# Patient Record
Sex: MALE | Race: WHITE | NOT HISPANIC OR LATINO | URBAN - METROPOLITAN AREA
[De-identification: names, ages, dates, MRNs, and addresses within clinical notes are randomized per-mention and may not be internally consistent; named-entity substitution may affect disease eponyms.]

---

## 2023-05-25 ENCOUNTER — OFFICE VISIT (OUTPATIENT)
Dept: PRIMARY CARE | Facility: CLINIC | Age: 58
End: 2023-05-25
Payer: COMMERCIAL

## 2023-05-25 ENCOUNTER — LAB (OUTPATIENT)
Dept: LAB | Facility: LAB | Age: 58
End: 2023-05-25
Payer: COMMERCIAL

## 2023-05-25 VITALS
OXYGEN SATURATION: 96 % | DIASTOLIC BLOOD PRESSURE: 113 MMHG | SYSTOLIC BLOOD PRESSURE: 173 MMHG | TEMPERATURE: 98.7 F | WEIGHT: 208 LBS | HEART RATE: 94 BPM | BODY MASS INDEX: 27.44 KG/M2 | RESPIRATION RATE: 18 BRPM

## 2023-05-25 DIAGNOSIS — E53.8 VITAMIN B12 DEFICIENCY: ICD-10-CM

## 2023-05-25 DIAGNOSIS — E55.9 VITAMIN D DEFICIENCY: ICD-10-CM

## 2023-05-25 DIAGNOSIS — N40.0 BENIGN PROSTATIC HYPERPLASIA, UNSPECIFIED WHETHER LOWER URINARY TRACT SYMPTOMS PRESENT: ICD-10-CM

## 2023-05-25 DIAGNOSIS — E78.6 LOW HDL (UNDER 40): ICD-10-CM

## 2023-05-25 DIAGNOSIS — I10 BENIGN DIASTOLIC HYPERTENSION: ICD-10-CM

## 2023-05-25 DIAGNOSIS — Z00.00 ENCOUNTER FOR HEALTH MAINTENANCE EXAMINATION: Primary | ICD-10-CM

## 2023-05-25 DIAGNOSIS — Z00.00 ENCOUNTER FOR HEALTH MAINTENANCE EXAMINATION: ICD-10-CM

## 2023-05-25 LAB
ALANINE AMINOTRANSFERASE (SGPT) (U/L) IN SER/PLAS: 20 U/L (ref 10–52)
ALBUMIN (G/DL) IN SER/PLAS: 4.6 G/DL (ref 3.4–5)
ALKALINE PHOSPHATASE (U/L) IN SER/PLAS: 58 U/L (ref 33–120)
ANION GAP IN SER/PLAS: 10 MMOL/L (ref 10–20)
ASPARTATE AMINOTRANSFERASE (SGOT) (U/L) IN SER/PLAS: 17 U/L (ref 9–39)
BILIRUBIN TOTAL (MG/DL) IN SER/PLAS: 1.3 MG/DL (ref 0–1.2)
CALCIDIOL (25 OH VITAMIN D3) (NG/ML) IN SER/PLAS: 21 NG/ML
CALCIUM (MG/DL) IN SER/PLAS: 9.9 MG/DL (ref 8.6–10.3)
CARBON DIOXIDE, TOTAL (MMOL/L) IN SER/PLAS: 32 MMOL/L (ref 21–32)
CHLORIDE (MMOL/L) IN SER/PLAS: 103 MMOL/L (ref 98–107)
CHOLESTEROL (MG/DL) IN SER/PLAS: 208 MG/DL (ref 0–199)
CHOLESTEROL IN HDL (MG/DL) IN SER/PLAS: 42.4 MG/DL
CHOLESTEROL/HDL RATIO: 4.9
COBALAMIN (VITAMIN B12) (PG/ML) IN SER/PLAS: 276 PG/ML (ref 211–911)
CREATININE (MG/DL) IN SER/PLAS: 1.27 MG/DL (ref 0.5–1.3)
ERYTHROCYTE DISTRIBUTION WIDTH (RATIO) BY AUTOMATED COUNT: 12.2 % (ref 11.5–14.5)
ERYTHROCYTE MEAN CORPUSCULAR HEMOGLOBIN CONCENTRATION (G/DL) BY AUTOMATED: 32.6 G/DL (ref 32–36)
ERYTHROCYTE MEAN CORPUSCULAR VOLUME (FL) BY AUTOMATED COUNT: 89 FL (ref 80–100)
ERYTHROCYTES (10*6/UL) IN BLOOD BY AUTOMATED COUNT: 5.3 X10E12/L (ref 4.5–5.9)
GFR MALE: 66 ML/MIN/1.73M2
GLUCOSE (MG/DL) IN SER/PLAS: 95 MG/DL (ref 74–99)
HEMATOCRIT (%) IN BLOOD BY AUTOMATED COUNT: 47.3 % (ref 41–52)
HEMOGLOBIN (G/DL) IN BLOOD: 15.4 G/DL (ref 13.5–17.5)
LDL: 145 MG/DL (ref 0–99)
LEUKOCYTES (10*3/UL) IN BLOOD BY AUTOMATED COUNT: 6.9 X10E9/L (ref 4.4–11.3)
PLATELETS (10*3/UL) IN BLOOD AUTOMATED COUNT: 211 X10E9/L (ref 150–450)
POTASSIUM (MMOL/L) IN SER/PLAS: 4.2 MMOL/L (ref 3.5–5.3)
PROSTATE SPECIFIC AG (NG/ML) IN SER/PLAS: 1.08 NG/ML (ref 0–4)
PROTEIN TOTAL: 7.3 G/DL (ref 6.4–8.2)
SODIUM (MMOL/L) IN SER/PLAS: 141 MMOL/L (ref 136–145)
TRIGLYCERIDE (MG/DL) IN SER/PLAS: 104 MG/DL (ref 0–149)
UREA NITROGEN (MG/DL) IN SER/PLAS: 17 MG/DL (ref 6–23)
VLDL: 21 MG/DL (ref 0–40)

## 2023-05-25 PROCEDURE — 82306 VITAMIN D 25 HYDROXY: CPT

## 2023-05-25 PROCEDURE — 80061 LIPID PANEL: CPT

## 2023-05-25 PROCEDURE — 3080F DIAST BP >= 90 MM HG: CPT | Performed by: FAMILY MEDICINE

## 2023-05-25 PROCEDURE — 85027 COMPLETE CBC AUTOMATED: CPT

## 2023-05-25 PROCEDURE — 84153 ASSAY OF PSA TOTAL: CPT

## 2023-05-25 PROCEDURE — 36415 COLL VENOUS BLD VENIPUNCTURE: CPT

## 2023-05-25 PROCEDURE — 82607 VITAMIN B-12: CPT

## 2023-05-25 PROCEDURE — 80053 COMPREHEN METABOLIC PANEL: CPT

## 2023-05-25 PROCEDURE — 99396 PREV VISIT EST AGE 40-64: CPT | Performed by: FAMILY MEDICINE

## 2023-05-25 PROCEDURE — 1036F TOBACCO NON-USER: CPT | Performed by: FAMILY MEDICINE

## 2023-05-25 PROCEDURE — 3077F SYST BP >= 140 MM HG: CPT | Performed by: FAMILY MEDICINE

## 2023-05-25 RX ORDER — LOSARTAN POTASSIUM 50 MG/1
50 TABLET ORAL DAILY
Qty: 90 TABLET | Refills: 1 | Status: SHIPPED | OUTPATIENT
Start: 2023-05-25 | End: 2023-07-20 | Stop reason: DRUGHIGH

## 2023-05-25 RX ORDER — LOSARTAN POTASSIUM 50 MG/1
1 TABLET ORAL DAILY
COMMUNITY
Start: 2019-07-22 | End: 2023-05-25 | Stop reason: SDUPTHER

## 2023-05-25 NOTE — PROGRESS NOTES
Subjective   Patient ID: Pacheco Jacobs is a 57 y.o. male who presents for Annual Exam (No complaints. Needs refills. ).    HPI     Review of Systems    Objective   BP (!) 173/113   Pulse 94   Temp 37.1 °C (98.7 °F)   Resp 18   Wt 94.3 kg (208 lb)   SpO2 96%   BMI 27.44 kg/m²     Physical Exam    Assessment/Plan

## 2023-05-25 NOTE — PROGRESS NOTES
"Subjective   Patient ID: Pacheco Jacobs is a 57 y.o. male who presents for Annual Exam (No complaints. Needs refills. ).    HPI   Patient comes in today for complete physical exam.  He is fasting for labs.  He is taking and using all his medicines as instructed.  His blood pressure is high today he has not been checking it at home.  I have asked him to start checking it at home and if it continues to be high we will need to adjust his medication.  He understands.    Family history: Mother hypertension.  Patient has 3 brothers.  He and his brother are the youngest as twins.  2 older brothers both with alcohol issues.  All have hypertension.  Dad .        2022  Patient comes in today, it has been over a year since he was here last. He states he ran out of his medications over a month ago and yet his blood pressure today is not too bad. He however does have strong family history of hypertension and wants to continue the medication. He is due for lab work as well.    2021  Patient comes in today for a well physical exam. He is currently without complaints. He is taking his medications as directed and not experiencing any side effects from them. He and his wife just returned from a trip to [place].    3/26/2021  Patient comes in today for 6-month checkup and refill of medication. He currently is without complaints. He will set up a return visit for complete physical and labs. He and his wife both got their first Covid vaccine last week.    2019  Patient comes in today for a wellness exam. He is currently without complaints. He is up-to-date on all his medications. He does need a refill of his medications.    2019  The patient is a 53 year old male who presents for a medication evaluation. The patient feels well with minor complaints. The patient displays hypertension. Note for \"Medication Evaluation\": Patient comes in today for checkup and refill of meds. He currently is without complaints. " He states that he is taking his medicines as directed and is not having any side effects from them.    patient did have a basal cell carcinoma removed from his left bicep earlier this year.    Pt wants to discuss a possible change in his bp meds due to a cough.    1/17/18  Patient comes in today for follow-up from an ER visit to [facility] on 1/8/18. He presented with complaints of a headache. He states he had a very stressful year in 2017. 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 There are also a lot of family issues that went on. His headaches however began during the holiday season. He had a nagging headache for about 2 weeks and the night before his ER visit he had a horrific postcoital headache. He almost went that night but took some anti-inflammatories with improvement of the pain. He went to the ER the next day. In the ER his blood pressure was extremely high at 185/126. They did a workup and evaluation and all of this testing was negative. The ER physician called me and I recommended that we increase his lisinopril from 10 mg to 20 mg which he has been on now for the past week. He states he has been taking that dose and not having any side effects but the numbers he is getting at home are still high. His number here today is 138/86. He currently does not have a headache.    He has lost 3 pounds since he was here a year ago. He is doing the Mediterranean diet but admits to not getting a lot of exercise.     He otherwise has been enjoying good health. He has 3 brothers who are alive and well. He is the youngest along with his twin brother. His mother passed away in 2017 with colon cancer. His father passed away of a stroke at 70 years old.     Review of Systems   All other systems reviewed and are negative.      Objective   BP (!) 173/113   Pulse 94   Temp 37.1 °C (98.7 °F)   Resp 18   Wt 94.3 kg (208 lb)   SpO2 96%   BMI  27.44 kg/m²     Physical Exam  Vitals reviewed.   Constitutional:       Appearance: He is well-developed.   HENT:      Head: Normocephalic and atraumatic.      Right Ear: Tympanic membrane, ear canal and external ear normal.      Left Ear: Tympanic membrane, ear canal and external ear normal.      Nose: Nose normal.      Mouth/Throat:      Mouth: Mucous membranes are moist.      Pharynx: Oropharynx is clear.   Eyes:      Extraocular Movements: Extraocular movements intact.      Conjunctiva/sclera: Conjunctivae normal.      Pupils: Pupils are equal, round, and reactive to light.   Cardiovascular:      Rate and Rhythm: Normal rate and regular rhythm.      Heart sounds: Normal heart sounds. No murmur heard.  Pulmonary:      Effort: Pulmonary effort is normal.      Breath sounds: Normal breath sounds. No wheezing.   Abdominal:      General: Abdomen is flat. Bowel sounds are normal.      Palpations: Abdomen is soft.   Musculoskeletal:         General: Normal range of motion.   Skin:     General: Skin is warm and dry.   Neurological:      General: No focal deficit present.      Mental Status: He is alert and oriented to person, place, and time. Mental status is at baseline.   Psychiatric:         Mood and Affect: Mood normal.         Behavior: Behavior normal.         Thought Content: Thought content normal.         Judgment: Judgment normal.         Assessment/Plan   Problem List Items Addressed This Visit       Benign diastolic hypertension    Relevant Medications    losartan (Cozaar) 50 mg tablet    Vitamin D deficiency    Relevant Orders    Vitamin D, Total    Vitamin B12 deficiency    Relevant Orders    CBC    Vitamin B12    Encounter for health maintenance examination - Primary    Relevant Orders    Comprehensive Metabolic Panel    Low HDL (under 40)    Relevant Orders    Lipid Panel     Other Visit Diagnoses       Benign prostatic hyperplasia, unspecified whether lower urinary tract symptoms present        Relevant  Orders    Prostate Specific Antigen        Continue all current meds.  RTC in one month for recheck, sooner should problems arise.  Will contact patient with lab results when available.  Medication list reconciled.  Thank you for visiting today!      Professional services: Some of this note was completed using Dragon voice technology and sometimes the software misinterprets words. This may include unintended errors with respect to translation of words, typographical errors or grammar errors which may not have been identified prior to finalization of the chart note. Please take this into account when reading the note. Thank you.

## 2023-06-09 ENCOUNTER — TELEPHONE (OUTPATIENT)
Dept: PRIMARY CARE | Facility: CLINIC | Age: 58
End: 2023-06-09
Payer: COMMERCIAL

## 2023-06-09 DIAGNOSIS — E53.8 VITAMIN B12 DEFICIENCY: Primary | ICD-10-CM

## 2023-06-09 RX ORDER — CYANOCOBALAMIN 1000 UG/ML
1000 INJECTION, SOLUTION INTRAMUSCULAR; SUBCUTANEOUS
Qty: 4 ML | Refills: 0 | Status: SHIPPED | OUTPATIENT
Start: 2023-06-09

## 2023-06-09 NOTE — TELEPHONE ENCOUNTER
Pt was given results and verbalized understanding. No questions at this time.     He would like his b12 sent to his local pharmacy.

## 2023-06-09 NOTE — TELEPHONE ENCOUNTER
----- Message from Herman Baker DO sent at 6/7/2023  7:17 AM EDT -----  Regarding: Labs  Please notify patient his vitamin D level was low at 21.  It should be between 30 and 100 the closer to 50 the better. It is an important vitamin for your heart, lungs, immune system and bones among other things in your body. Would recommend OTC vitamin D3 2000 units daily to get it into and keep it in the normal range and recheck in October 2023.    Also his cholesterol has gone up in particular your cholesterol/HDL ratio is at 4.9.  Any ratio 5.0 or higher is considered high risk for heart disease and stroke, ratio of 3.4 is ideal. Would recommend closely watching cholesterol in the diet and will recheck in October 2023. If not improved at that time we may need to start medication.    Finally his vitamin B12 level was low normal at 276.  It should be above 400, when less than 400 one may have both neurological and/or psychological symptoms. Would recommend B-12 shots monthly for 4 months and recheck in October 2023. These can be self injected at home or set up in our office as a nurse visit.  Let me know what you would like to do.    All the rest of the labs look good.  Continue current medicines and recheck in 1 year.  ----- Message -----  From: Lab, Background User  Sent: 5/25/2023  12:29 PM EDT  To: Herman Baker DO

## 2023-07-20 ENCOUNTER — TELEPHONE (OUTPATIENT)
Dept: PRIMARY CARE | Facility: CLINIC | Age: 58
End: 2023-07-20
Payer: COMMERCIAL

## 2023-07-20 DIAGNOSIS — I10 BENIGN DIASTOLIC HYPERTENSION: ICD-10-CM

## 2023-07-20 RX ORDER — LOSARTAN POTASSIUM 100 MG/1
100 TABLET ORAL DAILY
Qty: 90 TABLET | Refills: 0 | Status: SHIPPED | OUTPATIENT
Start: 2023-07-20 | End: 2023-08-22

## 2023-07-20 NOTE — TELEPHONE ENCOUNTER
I spoke with patient this evening and recommended that he increase his losartan 50mg to 100 mg daily.  He is to contact me in 2 weeks with an update.

## 2023-07-20 NOTE — TELEPHONE ENCOUNTER
Patient was in for his physical in may and was told to keep an eye on his blood pressure which he has been doing.  Patient states that the blood pressure has been a constant 160/95 never any lower than that.  Patient states that he does not know if maybe a medication change is needed and would like to speak to the doctor about this.    Thank You

## 2023-08-21 ENCOUNTER — TELEPHONE (OUTPATIENT)
Dept: PRIMARY CARE | Facility: CLINIC | Age: 58
End: 2023-08-21
Payer: COMMERCIAL

## 2023-08-21 DIAGNOSIS — I10 BENIGN DIASTOLIC HYPERTENSION: Primary | ICD-10-CM

## 2023-08-21 NOTE — TELEPHONE ENCOUNTER
Patient is calling in to speak with Dr. Baker in regards to his blood pressure medication.  Patient states that he has some questions and concerns to speak with him about.  Patient can be reached at [phone].    Thank You

## 2023-08-22 RX ORDER — AMLODIPINE BESYLATE 10 MG/1
10 TABLET ORAL DAILY
Qty: 30 TABLET | Refills: 1 | Status: SHIPPED | OUTPATIENT
Start: 2023-08-22 | End: 2023-10-17

## 2023-08-22 NOTE — TELEPHONE ENCOUNTER
Patient called back to talk to the doctor about his blood pressure.  Patient can be reached at [phone].

## 2023-08-22 NOTE — TELEPHONE ENCOUNTER
I spoke with patient this evening regarding his blood pressure and have increased his amlodipine from 5 mg to 10 mg.  He had been in the ER and they put him on amlodipine and he stopped taking the losartan medicine that he had been prescribed.  The amlodipine 5 mg was getting his blood pressure into the 140s over 90 range.

## 2023-09-06 DIAGNOSIS — E53.8 VITAMIN B12 DEFICIENCY: ICD-10-CM

## 2023-09-06 RX ORDER — CYANOCOBALAMIN 1000 UG/ML
INJECTION, SOLUTION INTRAMUSCULAR; SUBCUTANEOUS
Qty: 3 ML | Refills: 1 | OUTPATIENT
Start: 2023-09-06

## 2024-01-20 DIAGNOSIS — E78.6 LOW HDL (UNDER 40): ICD-10-CM

## 2024-01-20 DIAGNOSIS — E55.9 VITAMIN D DEFICIENCY: Primary | ICD-10-CM

## 2024-01-20 DIAGNOSIS — E53.8 DEFICIENCY OF OTHER SPECIFIED B GROUP VITAMINS: ICD-10-CM

## 2024-01-20 DIAGNOSIS — E53.8 VITAMIN B12 DEFICIENCY: ICD-10-CM

## 2024-01-23 RX ORDER — SYRINGE, DISPOSABLE, 1 ML
SYRINGE, EMPTY DISPOSABLE MISCELLANEOUS
Qty: 100 EACH | Refills: 0 | OUTPATIENT
Start: 2024-01-23

## 2024-01-23 NOTE — TELEPHONE ENCOUNTER
Please review if refill is appropriate.    Looks like this may have been sent in for vitamin b 12.

## 2024-02-13 DIAGNOSIS — I10 BENIGN DIASTOLIC HYPERTENSION: ICD-10-CM

## 2024-02-13 RX ORDER — AMLODIPINE BESYLATE 10 MG/1
10 TABLET ORAL DAILY
Qty: 30 TABLET | Refills: 0 | Status: SHIPPED | OUTPATIENT
Start: 2024-02-13 | End: 2024-03-12

## 2024-02-13 NOTE — TELEPHONE ENCOUNTER
Requested Prescriptions     Pending Prescriptions Disp Refills    amLODIPine (Norvasc) 10 mg tablet [Pharmacy Med Name: AMLODIPINE BESYLATE 10 MG TAB] 30 tablet 0     Sig: TAKE 1 TABLET BY MOUTH EVERY DAY

## 2024-04-24 ENCOUNTER — OFFICE VISIT (OUTPATIENT)
Dept: PRIMARY CARE | Facility: CLINIC | Age: 59
End: 2024-04-24
Payer: COMMERCIAL

## 2024-04-24 VITALS
SYSTOLIC BLOOD PRESSURE: 160 MMHG | BODY MASS INDEX: 28.36 KG/M2 | WEIGHT: 214 LBS | HEART RATE: 60 BPM | OXYGEN SATURATION: 95 % | HEIGHT: 73 IN | RESPIRATION RATE: 16 BRPM | DIASTOLIC BLOOD PRESSURE: 99 MMHG | TEMPERATURE: 98.2 F

## 2024-04-24 DIAGNOSIS — E53.8 VITAMIN B12 DEFICIENCY: ICD-10-CM

## 2024-04-24 DIAGNOSIS — N40.0 BENIGN PROSTATIC HYPERPLASIA, UNSPECIFIED WHETHER LOWER URINARY TRACT SYMPTOMS PRESENT: ICD-10-CM

## 2024-04-24 DIAGNOSIS — I10 BENIGN DIASTOLIC HYPERTENSION: Primary | ICD-10-CM

## 2024-04-24 PROCEDURE — 99213 OFFICE O/P EST LOW 20 MIN: CPT | Performed by: FAMILY MEDICINE

## 2024-04-24 RX ORDER — LOSARTAN POTASSIUM 100 MG/1
100 TABLET ORAL DAILY
Qty: 30 TABLET | Refills: 1 | Status: SHIPPED | OUTPATIENT
Start: 2024-04-24 | End: 2024-10-21

## 2024-04-24 ASSESSMENT — ENCOUNTER SYMPTOMS
SWEATS: 0
PALPITATIONS: 0
ORTHOPNEA: 0
HEADACHES: 0
BLURRED VISION: 0
SHORTNESS OF BREATH: 0
HYPERTENSION: 1
NECK PAIN: 0
PND: 0

## 2024-04-24 NOTE — PROGRESS NOTES
"Subjective   Patient ID: Pacheco Jacobs is a 58 y.o. male who presents for Follow-up (6 mo med fu. Believes that he is retaining water in the calves/ankles from taking the amlodipine. Noticed immediately but continues to take. The lisinopril used to give him a cough so he chose the \"lesser symptom\". His average readings at home are I the 140s/90s. ).  Hypertension  This is a chronic problem. The current episode started more than 1 year ago. The problem is unchanged. The problem is controlled. Pertinent negatives include no anxiety, blurred vision, chest pain, headaches, malaise/fatigue, neck pain, orthopnea, palpitations, peripheral edema, PND, shortness of breath or sweats. There are no associated agents to hypertension. There are no known risk factors for coronary artery disease. There are no compliance problems.      Patient presents today for 6-month checkup and refill of meds.  He currently is taking his medicines as directed and he is noticing some retention of fluid in his calves and ankles which he suspects is from the amlodipine.  He states that his blood pressure readings at home have been in the 140s over 90s.  He otherwise is without complaints.    Review of Systems   Constitutional:  Negative for malaise/fatigue.   Eyes:  Negative for blurred vision.   Respiratory:  Negative for shortness of breath.    Cardiovascular:  Negative for chest pain, palpitations, orthopnea and PND.   Musculoskeletal:  Negative for neck pain.   Neurological:  Negative for headaches.   All other systems reviewed and are negative.      Objective   Vitals:  BP (!) 160/99   Pulse 60   Temp 36.8 °C (98.2 °F)   Resp 16   Ht 1.854 m (6' 1\")   Wt 97.1 kg (214 lb)   SpO2 95%   BMI 28.23 kg/m²     Physical Exam  Vitals reviewed.   Constitutional:       Appearance: He is well-developed.   HENT:      Head: Normocephalic and atraumatic.      Right Ear: Tympanic membrane, ear canal and external ear normal.      Left Ear: Tympanic " membrane, ear canal and external ear normal.      Nose: Nose normal.      Mouth/Throat:      Mouth: Mucous membranes are moist.      Pharynx: Oropharynx is clear.   Eyes:      Extraocular Movements: Extraocular movements intact.      Conjunctiva/sclera: Conjunctivae normal.      Pupils: Pupils are equal, round, and reactive to light.   Cardiovascular:      Rate and Rhythm: Normal rate and regular rhythm.      Heart sounds: Normal heart sounds. No murmur heard.  Pulmonary:      Effort: Pulmonary effort is normal.      Breath sounds: Normal breath sounds. No wheezing.   Abdominal:      General: Abdomen is flat. Bowel sounds are normal.      Palpations: Abdomen is soft.   Musculoskeletal:         General: Normal range of motion.      Right lower leg: Edema (1-2+) present.      Left lower leg: Edema (1-2+) present.   Skin:     General: Skin is warm and dry.   Neurological:      General: No focal deficit present.      Mental Status: He is alert and oriented to person, place, and time. Mental status is at baseline.   Psychiatric:         Mood and Affect: Mood normal.         Behavior: Behavior normal.         Thought Content: Thought content normal.         Judgment: Judgment normal.       Assessment/Plan   Problem List Items Addressed This Visit       Benign diastolic hypertension - Primary    Relevant Medications    losartan (Cozaar) 100 mg tablet    Other Relevant Orders    Comprehensive Metabolic Panel    Vitamin B12 deficiency    Relevant Orders    CBC     Other Visit Diagnoses       Benign prostatic hyperplasia, unspecified whether lower urinary tract symptoms present        Relevant Orders    Prostate Specific Antigen        DC amlodipine  Take new medication as directed.  Continue other medications as directed.  RTC in one month for recheck, sooner should problems arise.  Will contact patient with lab results when available.  Medication list reconciled.  Thank you for visiting today!      Professional services:  Some of this note was completed using Dragon voice technology and sometimes the software misinterprets words. This may include unintended errors with respect to translation of words, typographical errors or grammar errors which may not have been identified prior to finalization of the chart note. Please take this into account when reading the note. Thank you.       Herman Baker DO 04/27/24 10:16 AM

## 2024-04-27 ASSESSMENT — ENCOUNTER SYMPTOMS
PND: 0
HEADACHES: 0
BLURRED VISION: 0
NECK PAIN: 0
PALPITATIONS: 0
HYPERTENSION: 1
SHORTNESS OF BREATH: 0
SWEATS: 0
ORTHOPNEA: 0

## 2024-06-18 DIAGNOSIS — I10 BENIGN DIASTOLIC HYPERTENSION: ICD-10-CM

## 2024-06-18 RX ORDER — LOSARTAN POTASSIUM 100 MG/1
100 TABLET ORAL DAILY
Qty: 90 TABLET | Refills: 1 | Status: SHIPPED | OUTPATIENT
Start: 2024-06-18 | End: 2024-12-15

## 2024-06-18 NOTE — TELEPHONE ENCOUNTER
Last OV 4/24/24  Requested Prescriptions     Pending Prescriptions Disp Refills    losartan (Cozaar) 100 mg tablet [Pharmacy Med Name: LOSARTAN POTASSIUM 100 MG TAB] 90 tablet 1     Sig: Take 1 tablet (100 mg) by mouth once daily.

## 2024-06-24 ENCOUNTER — APPOINTMENT (OUTPATIENT)
Dept: PRIMARY CARE | Facility: CLINIC | Age: 59
End: 2024-06-24
Payer: COMMERCIAL

## 2024-06-24 VITALS
TEMPERATURE: 98 F | DIASTOLIC BLOOD PRESSURE: 100 MMHG | BODY MASS INDEX: 28.37 KG/M2 | SYSTOLIC BLOOD PRESSURE: 166 MMHG | RESPIRATION RATE: 16 BRPM | WEIGHT: 215 LBS | HEART RATE: 58 BPM | OXYGEN SATURATION: 97 %

## 2024-06-24 DIAGNOSIS — I10 HYPERTENSION, UNSPECIFIED TYPE: Primary | ICD-10-CM

## 2024-06-24 PROCEDURE — 99213 OFFICE O/P EST LOW 20 MIN: CPT | Performed by: FAMILY MEDICINE

## 2024-06-24 RX ORDER — SPIRONOLACTONE 25 MG/1
25 TABLET ORAL DAILY
Qty: 30 TABLET | Refills: 5 | Status: SHIPPED | OUTPATIENT
Start: 2024-06-24 | End: 2024-12-21

## 2024-06-24 ASSESSMENT — ENCOUNTER SYMPTOMS
SWEATS: 0
BLURRED VISION: 0
SHORTNESS OF BREATH: 0
PALPITATIONS: 0
ORTHOPNEA: 0
PND: 0
NECK PAIN: 0
HYPERTENSION: 1
HEADACHES: 0

## 2024-06-24 NOTE — PROGRESS NOTES
Subjective   Patient ID: Pacheco Jacobs is a 58 y.o. male who presents for Follow-up (Present for BP fu. Gets anxiety and knows it will be high, and when he checks randomly at home the readings will be around 160/95- and states those are the good readings. Nothing below when he checks. ).  Hypertension  This is a chronic problem. The current episode started more than 1 year ago. The problem is unchanged. The problem is resistant. Associated symptoms include anxiety. Pertinent negatives include no blurred vision, chest pain, headaches, malaise/fatigue, neck pain, orthopnea, palpitations, peripheral edema, PND, shortness of breath or sweats. There are no associated agents to hypertension. Risk factors for coronary artery disease include dyslipidemia and stress. Compliance problems include medication side effects.      Patient comes in today for recheck of his blood pressure.  He states it is constantly high at home.  He is using a calibrated cuff at home and he checks his mother's as well as his father-in-law's blood pressure all the time as well.    Review of Systems   Constitutional:  Negative for malaise/fatigue.   Eyes:  Negative for blurred vision.   Respiratory:  Negative for shortness of breath.    Cardiovascular:  Negative for chest pain, palpitations, orthopnea and PND.   Musculoskeletal:  Negative for neck pain.   Neurological:  Negative for headaches.       Objective   Vitals:  BP (!) 166/100   Pulse 58   Temp 36.7 °C (98 °F)   Resp 16   Wt 97.5 kg (215 lb)   SpO2 97%   BMI 28.37 kg/m²     Physical Exam  Vitals reviewed.   Constitutional:       Appearance: He is well-developed.   HENT:      Head: Normocephalic and atraumatic.      Right Ear: Tympanic membrane, ear canal and external ear normal.      Left Ear: Tympanic membrane, ear canal and external ear normal.      Nose: Nose normal.      Mouth/Throat:      Mouth: Mucous membranes are moist.      Pharynx: Oropharynx is clear.   Eyes:       Extraocular Movements: Extraocular movements intact.      Conjunctiva/sclera: Conjunctivae normal.      Pupils: Pupils are equal, round, and reactive to light.   Cardiovascular:      Rate and Rhythm: Normal rate and regular rhythm.      Heart sounds: Normal heart sounds. No murmur heard.  Pulmonary:      Effort: Pulmonary effort is normal.      Breath sounds: Normal breath sounds. No wheezing.   Abdominal:      General: Abdomen is flat. Bowel sounds are normal.      Palpations: Abdomen is soft.   Musculoskeletal:         General: Normal range of motion.   Skin:     General: Skin is warm and dry.   Neurological:      General: No focal deficit present.      Mental Status: He is alert and oriented to person, place, and time. Mental status is at baseline.   Psychiatric:         Mood and Affect: Mood normal.         Behavior: Behavior normal.         Thought Content: Thought content normal.         Judgment: Judgment normal.         Assessment/Plan   Problem List Items Addressed This Visit    None  Visit Diagnoses       Hypertension, unspecified type    -  Primary    Relevant Medications    spironolactone (Aldactone) 25 mg tablet        Take new medication as directed.  Continue other medications as directed.  RTC in one month for recheck, sooner should problems arise.  Return to clinic in the near future for complete physical exam and fasting labs.  Medication list reconciled.  Thank you for visiting today!      Professional services: Some of this note was completed using Dragon voice technology and sometimes the software misinterprets words. This may include unintended errors with respect to translation of words, typographical errors or grammar errors which may not have been identified prior to finalization of the chart note. Please take this into account when reading the note. Thank you.               Herman Baker DO 06/24/24 2:51 PM

## 2024-06-27 LAB
NON-UH HIE CALCULATED LDL CHOLESTEROL: 133 MG/DL (ref 60–130)
NON-UH HIE CHOLESTEROL: 199 MG/DL (ref 100–200)
NON-UH HIE HDL CHOLESTEROL: 44 MG/DL (ref 40–60)
NON-UH HIE TOTAL CHOL/HDL CHOL RATIO: 4.5
NON-UH HIE TRIGLYCERIDES: 111 MG/DL (ref 30–150)

## 2024-07-24 ENCOUNTER — APPOINTMENT (OUTPATIENT)
Dept: PRIMARY CARE | Facility: CLINIC | Age: 59
End: 2024-07-24
Payer: COMMERCIAL

## 2024-07-24 ENCOUNTER — LAB (OUTPATIENT)
Dept: LAB | Facility: LAB | Age: 59
End: 2024-07-24
Payer: COMMERCIAL

## 2024-07-24 VITALS
BODY MASS INDEX: 28.37 KG/M2 | HEART RATE: 66 BPM | OXYGEN SATURATION: 96 % | SYSTOLIC BLOOD PRESSURE: 156 MMHG | TEMPERATURE: 98 F | RESPIRATION RATE: 16 BRPM | DIASTOLIC BLOOD PRESSURE: 103 MMHG | WEIGHT: 215 LBS

## 2024-07-24 DIAGNOSIS — E55.9 VITAMIN D DEFICIENCY: ICD-10-CM

## 2024-07-24 DIAGNOSIS — N40.0 BENIGN PROSTATIC HYPERPLASIA, UNSPECIFIED WHETHER LOWER URINARY TRACT SYMPTOMS PRESENT: ICD-10-CM

## 2024-07-24 DIAGNOSIS — E53.8 VITAMIN B12 DEFICIENCY: ICD-10-CM

## 2024-07-24 DIAGNOSIS — I10 BENIGN DIASTOLIC HYPERTENSION: ICD-10-CM

## 2024-07-24 DIAGNOSIS — Z11.59 ENCOUNTER FOR HEPATITIS C SCREENING TEST FOR LOW RISK PATIENT: ICD-10-CM

## 2024-07-24 DIAGNOSIS — I10 HYPERTENSION, UNSPECIFIED TYPE: ICD-10-CM

## 2024-07-24 DIAGNOSIS — Z00.00 ENCOUNTER FOR HEALTH MAINTENANCE EXAMINATION: Primary | ICD-10-CM

## 2024-07-24 LAB
25(OH)D3 SERPL-MCNC: 21 NG/ML (ref 30–100)
ALBUMIN SERPL BCP-MCNC: 4.5 G/DL (ref 3.4–5)
ALP SERPL-CCNC: 50 U/L (ref 33–120)
ALT SERPL W P-5'-P-CCNC: 25 U/L (ref 10–52)
ANION GAP SERPL CALC-SCNC: 11 MMOL/L (ref 10–20)
AST SERPL W P-5'-P-CCNC: 20 U/L (ref 9–39)
BILIRUB SERPL-MCNC: 1.2 MG/DL (ref 0–1.2)
BUN SERPL-MCNC: 17 MG/DL (ref 6–23)
CALCIUM SERPL-MCNC: 9.7 MG/DL (ref 8.6–10.3)
CHLORIDE SERPL-SCNC: 104 MMOL/L (ref 98–107)
CO2 SERPL-SCNC: 28 MMOL/L (ref 21–32)
CREAT SERPL-MCNC: 1.17 MG/DL (ref 0.5–1.3)
EGFRCR SERPLBLD CKD-EPI 2021: 73 ML/MIN/1.73M*2
ERYTHROCYTE [DISTWIDTH] IN BLOOD BY AUTOMATED COUNT: 11.9 % (ref 11.5–14.5)
GLUCOSE SERPL-MCNC: 75 MG/DL (ref 74–99)
HCT VFR BLD AUTO: 43.5 % (ref 41–52)
HGB BLD-MCNC: 14.6 G/DL (ref 13.5–17.5)
MCH RBC QN AUTO: 29.5 PG (ref 26–34)
MCHC RBC AUTO-ENTMCNC: 33.6 G/DL (ref 32–36)
MCV RBC AUTO: 88 FL (ref 80–100)
NRBC BLD-RTO: 0 /100 WBCS (ref 0–0)
PLATELET # BLD AUTO: 258 X10*3/UL (ref 150–450)
POTASSIUM SERPL-SCNC: 4.4 MMOL/L (ref 3.5–5.3)
PROT SERPL-MCNC: 6.6 G/DL (ref 6.4–8.2)
PSA SERPL-MCNC: 1.03 NG/ML
RBC # BLD AUTO: 4.95 X10*6/UL (ref 4.5–5.9)
SODIUM SERPL-SCNC: 139 MMOL/L (ref 136–145)
VIT B12 SERPL-MCNC: 294 PG/ML (ref 211–911)
WBC # BLD AUTO: 7.1 X10*3/UL (ref 4.4–11.3)

## 2024-07-24 PROCEDURE — 82607 VITAMIN B-12: CPT

## 2024-07-24 PROCEDURE — 82306 VITAMIN D 25 HYDROXY: CPT

## 2024-07-24 PROCEDURE — 84153 ASSAY OF PSA TOTAL: CPT

## 2024-07-24 PROCEDURE — 86803 HEPATITIS C AB TEST: CPT

## 2024-07-24 PROCEDURE — 85027 COMPLETE CBC AUTOMATED: CPT

## 2024-07-24 PROCEDURE — 36415 COLL VENOUS BLD VENIPUNCTURE: CPT

## 2024-07-24 PROCEDURE — 99396 PREV VISIT EST AGE 40-64: CPT | Performed by: FAMILY MEDICINE

## 2024-07-24 PROCEDURE — 80053 COMPREHEN METABOLIC PANEL: CPT

## 2024-07-24 RX ORDER — METOPROLOL SUCCINATE 25 MG/1
25 TABLET, EXTENDED RELEASE ORAL DAILY
Qty: 30 TABLET | Refills: 5 | Status: SHIPPED | OUTPATIENT
Start: 2024-07-24 | End: 2025-01-20

## 2024-07-24 ASSESSMENT — PROMIS GLOBAL HEALTH SCALE
RATE_GENERAL_HEALTH: VERY GOOD
EMOTIONAL_PROBLEMS: RARELY
RATE_SOCIAL_SATISFACTION: VERY GOOD
RATE_QUALITY_OF_LIFE: VERY GOOD
CARRYOUT_PHYSICAL_ACTIVITIES: COMPLETELY
RATE_MENTAL_HEALTH: VERY GOOD
CARRYOUT_SOCIAL_ACTIVITIES: EXCELLENT
RATE_AVERAGE_PAIN: 0
RATE_PHYSICAL_HEALTH: VERY GOOD

## 2024-07-24 ASSESSMENT — ENCOUNTER SYMPTOMS
OCCASIONAL FEELINGS OF UNSTEADINESS: 0
LOSS OF SENSATION IN FEET: 0
DEPRESSION: 0

## 2024-07-24 NOTE — PROGRESS NOTES
Subjective   Patient ID: Pacheco Jacobs is a 58 y.o. male who presents for Annual Exam (Patient reported health Good//Regular Dental Visits yes//Regular Eye Visits yes//Hearing Loss no//Balanced Diet yes//Weight Concerns no//Exercise Regular//// /).    HPI  Patient comes in today for complete physical exam.  He is currently without complaints.  He has been taking his blood pressure medication as directed but thus far his blood pressure is still uncontrolled.  He is tolerating his medications as ordered.    Patient is going to [place] on vacation for a week.  He is excited about this.    Patient's PHQ-9 score today is 0.    Review of Systems   All other systems reviewed and are negative.      Objective   Vitals:  BP (!) 156/103   Pulse 66   Temp 36.7 °C (98 °F)   Resp 16   Wt 97.5 kg (215 lb)   SpO2 96%   BMI 28.37 kg/m²     Physical Exam  Vitals reviewed.   Constitutional:       Appearance: He is well-developed.   HENT:      Head: Normocephalic and atraumatic.      Right Ear: Tympanic membrane, ear canal and external ear normal.      Left Ear: Tympanic membrane, ear canal and external ear normal.      Nose: Nose normal.      Mouth/Throat:      Mouth: Mucous membranes are moist.      Pharynx: Oropharynx is clear.   Eyes:      Extraocular Movements: Extraocular movements intact.      Conjunctiva/sclera: Conjunctivae normal.      Pupils: Pupils are equal, round, and reactive to light.   Cardiovascular:      Rate and Rhythm: Normal rate and regular rhythm.      Heart sounds: Normal heart sounds. No murmur heard.  Pulmonary:      Effort: Pulmonary effort is normal.      Breath sounds: Normal breath sounds. No wheezing.   Abdominal:      General: Abdomen is flat. Bowel sounds are normal.      Palpations: Abdomen is soft.   Genitourinary:     Penis: Normal.       Testes: Normal.      Comments: Rectal exam deferred  Musculoskeletal:         General: Normal range of motion.   Skin:     General: Skin is warm and dry.    Neurological:      General: No focal deficit present.      Mental Status: He is alert and oriented to person, place, and time. Mental status is at baseline.   Psychiatric:         Mood and Affect: Mood normal.         Behavior: Behavior normal.         Thought Content: Thought content normal.         Judgment: Judgment normal.         Assessment/Plan   Problem List Items Addressed This Visit       Vitamin D deficiency - Primary    Relevant Orders    Vitamin D 25-Hydroxy,Total (for eval of Vitamin D levels)    Vitamin D 25-Hydroxy,Total (for eval of Vitamin D levels)    Vitamin B12 deficiency    Relevant Orders    CBC    Vitamin B12    CBC    Vitamin B12    Encounter for health maintenance examination     Other Visit Diagnoses       Benign prostatic hyperplasia, unspecified whether lower urinary tract symptoms present        Relevant Orders    Prostate Specific Antigen    Prostate Specific Antigen    Hypertension, unspecified type        Relevant Medications    metoprolol succinate XL (Toprol-XL) 25 mg 24 hr tablet    Other Relevant Orders    Comprehensive Metabolic Panel    Comprehensive Metabolic Panel        Will contact patient with lab results when available.  Take new medication as directed.  Continue other medications as directed.  RTC in one month for recheck, sooner should problems arise.  Medication list reconciled.  Thank you for visiting today!      Professional services: Some of this note was completed using Dragon voice technology and sometimes the software misinterprets words. This may include unintended errors with respect to translation of words, typographical errors or grammar errors which may not have been identified prior to finalization of the chart note. Please take this into account when reading the note. Thank you.      Herman Baker DO 07/25/24 7:40 AM

## 2024-07-25 LAB — HCV AB SER QL: NONREACTIVE

## 2024-08-23 ENCOUNTER — APPOINTMENT (OUTPATIENT)
Dept: PRIMARY CARE | Facility: CLINIC | Age: 59
End: 2024-08-23
Payer: COMMERCIAL

## 2024-08-23 VITALS
DIASTOLIC BLOOD PRESSURE: 82 MMHG | HEART RATE: 68 BPM | WEIGHT: 219 LBS | RESPIRATION RATE: 20 BRPM | BODY MASS INDEX: 28.89 KG/M2 | OXYGEN SATURATION: 93 % | TEMPERATURE: 97.8 F | SYSTOLIC BLOOD PRESSURE: 156 MMHG

## 2024-08-23 DIAGNOSIS — I10 HYPERTENSION, UNSPECIFIED TYPE: ICD-10-CM

## 2024-08-23 DIAGNOSIS — I10 BENIGN DIASTOLIC HYPERTENSION: Primary | ICD-10-CM

## 2024-08-23 PROCEDURE — 99213 OFFICE O/P EST LOW 20 MIN: CPT | Performed by: FAMILY MEDICINE

## 2024-08-23 RX ORDER — METOPROLOL SUCCINATE 50 MG/1
50 TABLET, EXTENDED RELEASE ORAL DAILY
Qty: 90 TABLET | Refills: 0 | Status: SHIPPED | OUTPATIENT
Start: 2024-08-23 | End: 2025-02-19

## 2024-08-23 ASSESSMENT — PATIENT HEALTH QUESTIONNAIRE - PHQ9
1. LITTLE INTEREST OR PLEASURE IN DOING THINGS: NOT AT ALL
2. FEELING DOWN, DEPRESSED OR HOPELESS: NOT AT ALL
SUM OF ALL RESPONSES TO PHQ9 QUESTIONS 1 AND 2: 0

## 2024-08-23 ASSESSMENT — ENCOUNTER SYMPTOMS
LOSS OF SENSATION IN FEET: 0
ORTHOPNEA: 0
DEPRESSION: 0
PND: 0
OCCASIONAL FEELINGS OF UNSTEADINESS: 0
SWEATS: 0
BLURRED VISION: 0
PALPITATIONS: 0
NECK PAIN: 0
HEADACHES: 0
HYPERTENSION: 1
SHORTNESS OF BREATH: 0

## 2024-08-23 NOTE — PROGRESS NOTES
Subjective   Patient ID: Pacheco Jacobs is a 58 y.o. male who presents for Follow-up (For for BP med. One new BP med. He has kimberly checking at home and consistently in the 150-160s/100s).  Hypertension  This is a chronic problem. The current episode started more than 1 year ago. The problem is unchanged. The problem is resistant. Pertinent negatives include no anxiety, blurred vision, chest pain, headaches, malaise/fatigue, neck pain, orthopnea, palpitations, peripheral edema, PND, shortness of breath or sweats. There are no associated agents to hypertension. Risk factors for coronary artery disease include stress. Compliance problems include medication side effects.      Patient presents today for 1-month checkup and refill of meds. He currently is without complaints. He states that he is taking his medicines as directed and is not having any side effects from them.  He has noticed that his blood pressures consistently in the 150-160/100 range at home.  He is tolerating all his blood pressure medicines well without any side effects.    7/24/2024  Patient comes in today for complete physical exam.  He is currently without complaints.  He has been taking his blood pressure medication as directed but thus far his blood pressure is still uncontrolled.  He is tolerating his medications as ordered.    Patient is going to [place] on vacation for a week.  He is excited about this.    Patient's PHQ-9 score today is 0.    6/24/2024  Patient comes in today for recheck of his blood pressure.  He states it is constantly high at home.  He is using a calibrated cuff at home and he checks his mother's as well as his father-in-law's blood pressure all the time as well.    4/24/2024  Patient presents today for 6-month checkup and refill of meds.  He currently is taking his medicines as directed and he is noticing some retention of fluid in his calves and ankles which he suspects is from the amlodipine.  He states that his blood  pressure readings at home have been in the 140s over 90s.  He otherwise is without complaints.    Review of Systems   Constitutional:  Negative for malaise/fatigue.   Eyes:  Negative for blurred vision.   Respiratory:  Negative for shortness of breath.    Cardiovascular:  Negative for chest pain, palpitations, orthopnea and PND.   Musculoskeletal:  Negative for neck pain.   Neurological:  Negative for headaches.   All other systems reviewed and are negative.      Objective   Vitals:  /82   Pulse 68   Temp 36.6 °C (97.8 °F)   Resp 20   Wt 99.3 kg (219 lb)   SpO2 93%   BMI 28.89 kg/m²     Physical Exam  Constitutional:       Appearance: He is well-developed.   HENT:      Head: Normocephalic and atraumatic.      Right Ear: Tympanic membrane, ear canal and external ear normal.      Left Ear: Tympanic membrane, ear canal and external ear normal.      Nose: Nose normal.      Mouth/Throat:      Mouth: Mucous membranes are moist.      Pharynx: Oropharynx is clear.   Eyes:      Extraocular Movements: Extraocular movements intact.      Conjunctiva/sclera: Conjunctivae normal.      Pupils: Pupils are equal, round, and reactive to light.   Cardiovascular:      Rate and Rhythm: Normal rate and regular rhythm.      Heart sounds: Normal heart sounds. No murmur heard.  Pulmonary:      Effort: Pulmonary effort is normal.      Breath sounds: Normal breath sounds. No wheezing.   Abdominal:      General: Abdomen is flat. Bowel sounds are normal.      Palpations: Abdomen is soft.   Musculoskeletal:         General: Normal range of motion.   Skin:     General: Skin is warm and dry.   Neurological:      General: No focal deficit present.      Mental Status: He is alert and oriented to person, place, and time. Mental status is at baseline.   Psychiatric:         Mood and Affect: Mood normal.         Behavior: Behavior normal.         Thought Content: Thought content normal.         Judgment: Judgment normal.        Assessment/Plan   Problem List Items Addressed This Visit       Benign diastolic hypertension - Primary    Overview     Continue spironolactone 25 mg daily  Continue losartan 100 mg daily.  Increase metoprolol to 50 mg daily.  Can increase to 75 mg in 2 weeks if blood pressure not controlled.  Follow-up in October.         Relevant Medications    metoprolol succinate XL (Toprol-XL) 50 mg 24 hr tablet    RESOLVED: Hypertension   Take new dose of medication as directed.  Continue other medications as directed.  Follow up in 2 months for recheck if all remains stable, sooner if problems arise.  Medication list reconciled.  Thank you for visiting today!      Professional services: Some of this note was completed using Dragon voice technology and sometimes the software misinterprets words. This may include unintended errors with respect to translation of words, typographical errors or grammar errors which may not have been identified prior to finalization of the chart note. Please take this into account when reading the note. Thank you.               Herman Baker,  08/23/24 3:18 PM

## 2024-08-30 ENCOUNTER — TELEPHONE (OUTPATIENT)
Dept: PRIMARY CARE | Facility: CLINIC | Age: 59
End: 2024-08-30
Payer: COMMERCIAL

## 2024-08-30 DIAGNOSIS — I10 BENIGN DIASTOLIC HYPERTENSION: ICD-10-CM

## 2024-08-30 RX ORDER — LOSARTAN POTASSIUM 100 MG/1
100 TABLET ORAL DAILY
Qty: 90 TABLET | Refills: 1 | Status: SHIPPED | OUTPATIENT
Start: 2024-08-30 | End: 2025-02-26

## 2024-08-30 NOTE — TELEPHONE ENCOUNTER
Patient called stating that he was supposed to start on Losartan, but nothing has been sent into his pharmacy.  Patient can be reached at [phone].      Thank You

## 2024-10-23 ENCOUNTER — APPOINTMENT (OUTPATIENT)
Dept: PRIMARY CARE | Facility: CLINIC | Age: 59
End: 2024-10-23
Payer: COMMERCIAL

## 2024-10-23 VITALS
RESPIRATION RATE: 20 BRPM | DIASTOLIC BLOOD PRESSURE: 102 MMHG | HEART RATE: 58 BPM | WEIGHT: 220 LBS | SYSTOLIC BLOOD PRESSURE: 166 MMHG | BODY MASS INDEX: 29.03 KG/M2 | OXYGEN SATURATION: 95 % | TEMPERATURE: 97.8 F

## 2024-10-23 DIAGNOSIS — I10 BENIGN DIASTOLIC HYPERTENSION: ICD-10-CM

## 2024-10-23 DIAGNOSIS — I10 HYPERTENSION, ESSENTIAL, BENIGN: Primary | ICD-10-CM

## 2024-10-23 PROCEDURE — 99213 OFFICE O/P EST LOW 20 MIN: CPT | Performed by: FAMILY MEDICINE

## 2024-10-23 RX ORDER — SPIRONOLACTONE 25 MG/1
25 TABLET ORAL DAILY
Qty: 90 TABLET | Refills: 1 | Status: SHIPPED | OUTPATIENT
Start: 2024-10-23 | End: 2025-04-21

## 2024-10-23 RX ORDER — METOPROLOL SUCCINATE 100 MG/1
150 TABLET, EXTENDED RELEASE ORAL DAILY
Qty: 135 TABLET | Refills: 0 | Status: SHIPPED | OUTPATIENT
Start: 2024-10-23 | End: 2025-01-21

## 2024-10-23 NOTE — PROGRESS NOTES
Subjective   Patient ID: Pacheco Jacobs is a 59 y.o. male who presents for Follow-up (2 mo fu for BP. Averaging 160s/95s, even with increasing the metoprolol to 100mg daily. He as seen some readings in the 140s but not consistent. ).  HPI  Patient returns today for recheck of his blood pressure.  It continues to be high.  Patient is asymptomatic to it however.  He is also taking all his medicines without any side effects.  After discussing it with him today it appears he has not been taking the Aldactone daily as directed.    Review of Systems   All other systems reviewed and are negative.      Objective   Vitals:  BP (!) 166/102   Pulse 58   Temp 36.6 °C (97.8 °F)   Resp 20   Wt 99.8 kg (220 lb)   SpO2 95%   BMI 29.03 kg/m²     Physical Exam  Constitutional:       Appearance: He is well-developed.   HENT:      Head: Normocephalic and atraumatic.      Right Ear: Tympanic membrane, ear canal and external ear normal.      Left Ear: Tympanic membrane, ear canal and external ear normal.      Nose: Nose normal.      Mouth/Throat:      Mouth: Mucous membranes are moist.      Pharynx: Oropharynx is clear.   Eyes:      Extraocular Movements: Extraocular movements intact.      Conjunctiva/sclera: Conjunctivae normal.      Pupils: Pupils are equal, round, and reactive to light.   Cardiovascular:      Rate and Rhythm: Normal rate and regular rhythm.      Heart sounds: Normal heart sounds. No murmur heard.  Pulmonary:      Effort: Pulmonary effort is normal.      Breath sounds: Normal breath sounds. No wheezing.   Abdominal:      General: Abdomen is flat. Bowel sounds are normal.      Palpations: Abdomen is soft.   Musculoskeletal:         General: Normal range of motion.   Skin:     General: Skin is warm and dry.   Neurological:      General: No focal deficit present.      Mental Status: He is alert and oriented to person, place, and time. Mental status is at baseline.   Psychiatric:         Mood and Affect: Mood normal.          Behavior: Behavior normal.         Thought Content: Thought content normal.         Judgment: Judgment normal.         Assessment/Plan   Problem List Items Addressed This Visit       Hypertension, essential, benign - Primary    Overview     Continue spironolactone 25 mg daily  Continue losartan 100 mg daily.  Increase metoprolol to 150 mg daily.           Relevant Medications    metoprolol succinate XL (Toprol-XL) 100 mg 24 hr tablet    spironolactone (Aldactone) 25 mg tablet   Continue all current meds.  RTC in one month for recheck, sooner should problems arise.  Medication list reconciled.  Thank you for visiting today!      Professional services: Some of this note was completed using Dragon voice technology and sometimes the software misinterprets words. This may include unintended errors with respect to translation of words, typographical errors or grammar errors which may not have been identified prior to finalization of the chart note. Please take this into account when reading the note. Thank you.       Herman Baker,  10/25/24 4:44 PM

## 2024-11-22 ENCOUNTER — APPOINTMENT (OUTPATIENT)
Dept: PRIMARY CARE | Facility: CLINIC | Age: 59
End: 2024-11-22
Payer: COMMERCIAL

## 2024-11-22 VITALS
WEIGHT: 220 LBS | DIASTOLIC BLOOD PRESSURE: 99 MMHG | SYSTOLIC BLOOD PRESSURE: 152 MMHG | BODY MASS INDEX: 29.16 KG/M2 | TEMPERATURE: 97.5 F | HEIGHT: 73 IN | HEART RATE: 56 BPM | RESPIRATION RATE: 18 BRPM | OXYGEN SATURATION: 96 %

## 2024-11-22 DIAGNOSIS — I10 HYPERTENSION, ESSENTIAL, BENIGN: ICD-10-CM

## 2024-11-22 DIAGNOSIS — Z83.49 FAMILY HISTORY OF THYROID DISEASE: Primary | ICD-10-CM

## 2024-11-22 PROCEDURE — 99213 OFFICE O/P EST LOW 20 MIN: CPT | Performed by: FAMILY MEDICINE

## 2024-11-22 RX ORDER — DILTIAZEM HYDROCHLORIDE 180 MG/1
180 CAPSULE, EXTENDED RELEASE ORAL DAILY
Qty: 30 CAPSULE | Refills: 1 | Status: SHIPPED | OUTPATIENT
Start: 2024-11-22 | End: 2025-11-22

## 2024-11-22 NOTE — PROGRESS NOTES
"Subjective   Patient ID: Pacheco Jacobs is a 59 y.o. male who presents for Follow-up (1 month follow-up for blood pressure medications. Patient dong well, does check BP at home with readings around 145-155/90-95. ).  HPI  Patient comes in today for follow-up on his blood pressure.  He is getting readings similar to the one we got here today averaging in the 145-155/90-95 at home.  Will need to adjust his medication again.    Patient also concerned because his twin brother was recently found to have a thyroid nodule and is currently in the midst of workup.    Review of Systems   All other systems reviewed and are negative.      Objective   Vitals:  BP (!) 152/99   Pulse 56   Temp 36.4 °C (97.5 °F)   Resp 18   Ht 1.854 m (6' 1\")   Wt 99.8 kg (220 lb)   SpO2 96%   BMI 29.03 kg/m²     Physical Exam  Constitutional:       Appearance: He is well-developed.   HENT:      Head: Normocephalic and atraumatic.      Right Ear: Tympanic membrane, ear canal and external ear normal.      Left Ear: Tympanic membrane, ear canal and external ear normal.      Nose: Nose normal.      Mouth/Throat:      Mouth: Mucous membranes are moist.      Pharynx: Oropharynx is clear.   Eyes:      Extraocular Movements: Extraocular movements intact.      Conjunctiva/sclera: Conjunctivae normal.      Pupils: Pupils are equal, round, and reactive to light.   Neck:      Comments: No thyroid nodules palpable at this time  Cardiovascular:      Rate and Rhythm: Normal rate and regular rhythm.      Heart sounds: Normal heart sounds. No murmur heard.  Pulmonary:      Effort: Pulmonary effort is normal.      Breath sounds: Normal breath sounds. No wheezing.   Abdominal:      General: Abdomen is flat. Bowel sounds are normal.      Palpations: Abdomen is soft.   Musculoskeletal:         General: Normal range of motion.   Skin:     General: Skin is warm and dry.   Neurological:      General: No focal deficit present.      Mental Status: He is alert and " oriented to person, place, and time. Mental status is at baseline.   Psychiatric:         Mood and Affect: Mood normal.         Behavior: Behavior normal.         Thought Content: Thought content normal.         Judgment: Judgment normal.         Assessment/Plan   Problem List Items Addressed This Visit       Hypertension, essential, benign    Overview     Continue spironolactone 25 mg daily  Continue losartan 100 mg daily.  Increase metoprolol to 150 mg daily.           Relevant Medications    dilTIAZem ER (Tiazac) 180 mg 24 hr capsule     Other Visit Diagnoses       Family history of thyroid disease    -  Primary    Relevant Orders    TSH with reflex to Free T4 if abnormal    US thyroid        Take new medication as directed.  Continue other medications as directed.  RTC in one month for recheck, sooner should problems arise.  Will contact patient with lab results when available.  Will contact patient with ultrasound x-ray results when they are available.  Medication list reconciled.  Thank you for visiting today!      Professional services: Some of this note was completed using Dragon voice technology and sometimes the software misinterprets words. This may include unintended errors with respect to translation of words, typographical errors or grammar errors which may not have been identified prior to finalization of the chart note. Please take this into account when reading the note. Thank you.       Herman Baker,  11/22/24 4:17 PM

## 2024-11-26 ENCOUNTER — HOSPITAL ENCOUNTER (OUTPATIENT)
Dept: RADIOLOGY | Facility: CLINIC | Age: 59
Discharge: HOME | End: 2024-11-26
Payer: COMMERCIAL

## 2024-11-26 DIAGNOSIS — Z83.49 FAMILY HISTORY OF THYROID DISEASE: ICD-10-CM

## 2024-11-26 PROCEDURE — 76536 US EXAM OF HEAD AND NECK: CPT

## 2024-11-26 PROCEDURE — 76536 US EXAM OF HEAD AND NECK: CPT | Performed by: RADIOLOGY

## 2024-12-05 ENCOUNTER — TELEPHONE (OUTPATIENT)
Dept: PRIMARY CARE | Facility: CLINIC | Age: 59
End: 2024-12-05
Payer: COMMERCIAL

## 2024-12-05 DIAGNOSIS — E04.1 NODULE OF RIGHT LOBE OF THYROID GLAND: Primary | ICD-10-CM

## 2024-12-05 NOTE — TELEPHONE ENCOUNTER
Kyle Jaime,  I placed a referral order in for Dr. Sotero Booker.  I would like him to be seen this month.  If Dr. Booker is not available any  head/neck surgeon on this side of [facility].  Thank you.

## 2024-12-05 NOTE — TELEPHONE ENCOUNTER
Patient called in this morning stating that he would like a referral to go and see a head/neck specialist from the doctor.  Patient can be reached at [phone].        Thank You

## 2024-12-18 ENCOUNTER — OFFICE VISIT (OUTPATIENT)
Dept: OTOLARYNGOLOGY | Facility: CLINIC | Age: 59
End: 2024-12-18
Payer: COMMERCIAL

## 2024-12-18 VITALS
WEIGHT: 218 LBS | HEIGHT: 73 IN | OXYGEN SATURATION: 97 % | BODY MASS INDEX: 28.89 KG/M2 | DIASTOLIC BLOOD PRESSURE: 85 MMHG | SYSTOLIC BLOOD PRESSURE: 126 MMHG | HEART RATE: 80 BPM

## 2024-12-18 DIAGNOSIS — E04.1 NODULE OF RIGHT LOBE OF THYROID GLAND: ICD-10-CM

## 2024-12-18 PROCEDURE — 1036F TOBACCO NON-USER: CPT | Performed by: STUDENT IN AN ORGANIZED HEALTH CARE EDUCATION/TRAINING PROGRAM

## 2024-12-18 PROCEDURE — 3079F DIAST BP 80-89 MM HG: CPT | Performed by: STUDENT IN AN ORGANIZED HEALTH CARE EDUCATION/TRAINING PROGRAM

## 2024-12-18 PROCEDURE — 3008F BODY MASS INDEX DOCD: CPT | Performed by: STUDENT IN AN ORGANIZED HEALTH CARE EDUCATION/TRAINING PROGRAM

## 2024-12-18 PROCEDURE — 3074F SYST BP LT 130 MM HG: CPT | Performed by: STUDENT IN AN ORGANIZED HEALTH CARE EDUCATION/TRAINING PROGRAM

## 2024-12-18 PROCEDURE — 99203 OFFICE O/P NEW LOW 30 MIN: CPT | Performed by: STUDENT IN AN ORGANIZED HEALTH CARE EDUCATION/TRAINING PROGRAM

## 2024-12-18 PROCEDURE — 99213 OFFICE O/P EST LOW 20 MIN: CPT | Performed by: STUDENT IN AN ORGANIZED HEALTH CARE EDUCATION/TRAINING PROGRAM

## 2024-12-18 SDOH — ECONOMIC STABILITY: FOOD INSECURITY: WITHIN THE PAST 12 MONTHS, THE FOOD YOU BOUGHT JUST DIDN'T LAST AND YOU DIDN'T HAVE MONEY TO GET MORE.: NEVER TRUE

## 2024-12-18 SDOH — ECONOMIC STABILITY: FOOD INSECURITY: WITHIN THE PAST 12 MONTHS, YOU WORRIED THAT YOUR FOOD WOULD RUN OUT BEFORE YOU GOT MONEY TO BUY MORE.: NEVER TRUE

## 2024-12-18 ASSESSMENT — LIFESTYLE VARIABLES
HOW OFTEN DO YOU HAVE SIX OR MORE DRINKS ON ONE OCCASION: NEVER
HOW MANY STANDARD DRINKS CONTAINING ALCOHOL DO YOU HAVE ON A TYPICAL DAY: 1 OR 2
AUDIT-C TOTAL SCORE: 3
SKIP TO QUESTIONS 9-10: 1
HOW OFTEN DO YOU HAVE A DRINK CONTAINING ALCOHOL: 2-3 TIMES A WEEK

## 2024-12-18 ASSESSMENT — PATIENT HEALTH QUESTIONNAIRE - PHQ9
2. FEELING DOWN, DEPRESSED OR HOPELESS: NOT AT ALL
1. LITTLE INTEREST OR PLEASURE IN DOING THINGS: NOT AT ALL
SUM OF ALL RESPONSES TO PHQ9 QUESTIONS 1 AND 2: 0

## 2024-12-18 ASSESSMENT — COLUMBIA-SUICIDE SEVERITY RATING SCALE - C-SSRS
6. HAVE YOU EVER DONE ANYTHING, STARTED TO DO ANYTHING, OR PREPARED TO DO ANYTHING TO END YOUR LIFE?: NO
2. HAVE YOU ACTUALLY HAD ANY THOUGHTS OF KILLING YOURSELF?: NO
1. IN THE PAST MONTH, HAVE YOU WISHED YOU WERE DEAD OR WISHED YOU COULD GO TO SLEEP AND NOT WAKE UP?: NO

## 2024-12-18 ASSESSMENT — ENCOUNTER SYMPTOMS
DEPRESSION: 0
LOSS OF SENSATION IN FEET: 0
OCCASIONAL FEELINGS OF UNSTEADINESS: 0

## 2024-12-18 ASSESSMENT — PAIN SCALES - GENERAL: PAINLEVEL_OUTOF10: 0-NO PAIN

## 2024-12-18 NOTE — PATIENT INSTRUCTIONS
Thank you for visiting our office today. For future questions or appointments please try to call the office directly at [phone].

## 2024-12-18 NOTE — PROGRESS NOTES
SUBJECTIVE  Patient ID: Pacheco Jacobs is a 59 y.o. male who presents for Thyroid Problem.    Referred regarding recent thyroid ultrasound. Last PCP note and subsequent communications reviewed.    He reports that his twin brother had a neck ultrasound and there was concern for thyroid nodules. He ended up getting a thyroid ultrasound. His brother ended up getting a biopsy and this was benign. No family history of thyroid cancer nor thyroid disease. No history of neck radiation. No dysphagia, odynophagia, change in voice.    Review of Systems  Complete ROS negative except as noted above or on patient intake form and as above.    OBJECTIVE  Physical Exam  CONSTITUTIONAL: Well appearing male who appears stated age.  PSYCHIATRIC: Alert, appropriate mood and affect.  RESPIRATORY: Normal inspiration and expiration and chest wall expansion; no use of accessory muscles to breathe.  VOICE: Clear speech without hoarseness. No stridor nor stertor.  HEAD, FACE, AND SKIN: Symmetric facial feature. Small warty growth at the central chin. The parotid and submandibular glands were normal to palpation.  EYES: Pupils were equal in size and reactive to light. Extra-ocular muscle function was intact. No nystagmus was observed. Vision was grossly intact.  EARS: External ears were normally formed with no lesions. The external auditory canals were narrow but clear. The tympanic membranes were intact and in the neutral position. No significant retraction pockets nor effusions were appreciated.  NOSE: Nasal dorsum was midline.  ORAL CAVITY: Lips were without lesions. Moist mucous membranes. No lesions appreciated along the gingiva, oral mucosa, nor tongue.  DENTITION: Grossly normal without obvious infection nor inflammation.  OROPHARYNX: No lesion nor mucosal abnormality. The uvula was normal appearing. The tonsils were 1+.  NECK: Visualization and palpation of the neck revealed no mass lesions, no thyromegaly or thyroid masses. No  "cutaneous lesions appreciated.  LYMPHATICS (CERVICAL): There were no palpable lymph nodes in the posterior triangle, submandibular triangle, jugulodigastric region, nor central neck.  NEUROLOGIC: Cranial nerves III, IV, and VI were noted to be intact via extra-ocular muscle movement testing. Cranial nerve V was noted to be intact to soft touch bilaterally. Cranial nerve VII was noted to be intact and symmetric by facial movement. Cranial nerve VIII was tested with normal voice examination and revealed grossly normal hearing. Cranial nerves IX and X noted to be intact by palatal movement. Cranial nerve XI noted to be intact via shoulder shrug.  Cranial nerve XII noted to be intact with active and symmetric tongue movement.     No results found for: \"TSH\"    Radiology  Thyroid ultrasound 11/25/2024  1. Right thyroid lobe 0.7 cm TI-RADS 4 nodule and left thyroid lobe  1.6 cm TI-RADS 3 nodule. Surveillance may be considered.    CERVICAL LYMPH NODES:  A left cervical level 2 nonspecific mildly prominent lymph node  measures 2 x 0.8 x 0.7 cm.    ASSESSMENT/PLAN  Diagnoses and all orders for this visit:  Nodule of right lobe of thyroid gland  -     Referral to ENT      59 y.o. male presenting with recent imaging demonstrating multiple right-sided thyroid nodules.    The patient reportedly began workup for thyroid nodules when his twin brother was noted to have nodules on ultrasound.  Eventually he had an FNA that demonstrated a benign nodule.  On evaluation of the imaging the patient has several small nodules that do not meet qualification for biopsy at this time.  We discussed the benign nature of most nodules as well as the indolent nature of most thyroid cancers.  I recommended follow-up ultrasound in 1 year with follow-up with me to discuss results.    He can otherwise see me earlier as needed.    This note was created using speech recognition transcription software. Despite proofreading, typographical errors may be " present that affect the meaning of the content. Please contact my office with any questions.

## 2024-12-18 NOTE — LETTER
December 18, 2024     Herman Baker [place]  [address]  [address]  [facility] [address]    Patient: Pacheco Jacobs   YOB: 1965   Date of Visit: 12/18/2024       Dear Dr. Herman Baker DO:    Thank you for referring Pacheco Jacobs to me for evaluation. Below are my notes for this consultation.  If you have questions, please do not hesitate to call me. I look forward to following your patient along with you.       Sincerely,     Sin Almanzar MD      CC: No Recipients  ______________________________________________________________________________________    SUBJECTIVE  Patient ID: Pacheco Jacobs is a 59 y.o. male who presents for Thyroid Problem.    Referred regarding recent thyroid ultrasound. Last PCP note and subsequent communications reviewed.    He reports that his twin brother had a neck ultrasound and there was concern for thyroid nodules. He ended up getting a thyroid ultrasound. His brother ended up getting a biopsy and this was benign. No family history of thyroid cancer nor thyroid disease. No history of neck radiation. No dysphagia, odynophagia, change in voice.    Review of Systems  Complete ROS negative except as noted above or on patient intake form and as above.    OBJECTIVE  Physical Exam  CONSTITUTIONAL: Well appearing male who appears stated age.  PSYCHIATRIC: Alert, appropriate mood and affect.  RESPIRATORY: Normal inspiration and expiration and chest wall expansion; no use of accessory muscles to breathe.  VOICE: Clear speech without hoarseness. No stridor nor stertor.  HEAD, FACE, AND SKIN: Symmetric facial feature. Small warty growth at the central chin. The parotid and submandibular glands were normal to palpation.  EYES: Pupils were equal in size and reactive to light. Extra-ocular muscle function was intact. No nystagmus was observed. Vision was grossly intact.  EARS: External ears were normally formed with no lesions. The external auditory canals were narrow but  "clear. The tympanic membranes were intact and in the neutral position. No significant retraction pockets nor effusions were appreciated.  NOSE: Nasal dorsum was midline.  ORAL CAVITY: Lips were without lesions. Moist mucous membranes. No lesions appreciated along the gingiva, oral mucosa, nor tongue.  DENTITION: Grossly normal without obvious infection nor inflammation.  OROPHARYNX: No lesion nor mucosal abnormality. The uvula was normal appearing. The tonsils were 1+.  NECK: Visualization and palpation of the neck revealed no mass lesions, no thyromegaly or thyroid masses. No cutaneous lesions appreciated.  LYMPHATICS (CERVICAL): There were no palpable lymph nodes in the posterior triangle, submandibular triangle, jugulodigastric region, nor central neck.  NEUROLOGIC: Cranial nerves III, IV, and VI were noted to be intact via extra-ocular muscle movement testing. Cranial nerve V was noted to be intact to soft touch bilaterally. Cranial nerve VII was noted to be intact and symmetric by facial movement. Cranial nerve VIII was tested with normal voice examination and revealed grossly normal hearing. Cranial nerves IX and X noted to be intact by palatal movement. Cranial nerve XI noted to be intact via shoulder shrug.  Cranial nerve XII noted to be intact with active and symmetric tongue movement.     No results found for: \"TSH\"    Radiology  Thyroid ultrasound 11/25/2024  1. Right thyroid lobe 0.7 cm TI-RADS 4 nodule and left thyroid lobe  1.6 cm TI-RADS 3 nodule. Surveillance may be considered.    CERVICAL LYMPH NODES:  A left cervical level 2 nonspecific mildly prominent lymph node  measures 2 x 0.8 x 0.7 cm.    ASSESSMENT/PLAN  Diagnoses and all orders for this visit:  Nodule of right lobe of thyroid gland  -     Referral to ENT      59 y.o. male presenting with recent imaging demonstrating multiple right-sided thyroid nodules.    The patient reportedly began workup for thyroid nodules when his twin brother was " noted to have nodules on ultrasound.  Eventually he had an FNA that demonstrated a benign nodule.  On evaluation of the imaging the patient has several small nodules that do not meet qualification for biopsy at this time.  We discussed the benign nature of most nodules as well as the indolent nature of most thyroid cancers.  I recommended follow-up ultrasound in 1 year with follow-up with me to discuss results.    He can otherwise see me earlier as needed.    This note was created using speech recognition transcription software. Despite proofreading, typographical errors may be present that affect the meaning of the content. Please contact my office with any questions.

## 2024-12-19 ENCOUNTER — TELEPHONE (OUTPATIENT)
Dept: PRIMARY CARE | Facility: CLINIC | Age: 59
End: 2024-12-19
Payer: COMMERCIAL

## 2024-12-19 DIAGNOSIS — I10 BENIGN DIASTOLIC HYPERTENSION: ICD-10-CM

## 2024-12-19 DIAGNOSIS — I10 HYPERTENSION, ESSENTIAL, BENIGN: ICD-10-CM

## 2024-12-19 NOTE — TELEPHONE ENCOUNTER
Please let him know that his blood pressure looks good.  I therefore would like to change his appointment on Monday to 1 month from now to make sure it stays good.  In the meantime with medicine does he need refilled?  Have a great day and happy holidays!!!

## 2024-12-19 NOTE — TELEPHONE ENCOUNTER
Patient called in this afternoon stating that he had an ENT appointment yesterday and is asking if the doctor still needs to see him on Monday or will he send in the medication refill?  Patient states that the results are in his chart and would like the doctor to look at them and tell him what he would prefer that he do.  Patient can be reached at [phone].        Thank You

## 2024-12-20 RX ORDER — DILTIAZEM HYDROCHLORIDE 180 MG/1
180 CAPSULE, EXTENDED RELEASE ORAL DAILY
Qty: 90 CAPSULE | Refills: 1 | Status: SHIPPED | OUTPATIENT
Start: 2024-12-20 | End: 2025-06-18

## 2024-12-20 RX ORDER — SPIRONOLACTONE 25 MG/1
25 TABLET ORAL DAILY
Qty: 90 TABLET | Refills: 1 | Status: SHIPPED | OUTPATIENT
Start: 2024-12-20 | End: 2025-06-18

## 2024-12-20 RX ORDER — LOSARTAN POTASSIUM 100 MG/1
100 TABLET ORAL DAILY
Qty: 90 TABLET | Refills: 1 | Status: SHIPPED | OUTPATIENT
Start: 2024-12-20 | End: 2025-06-18

## 2024-12-20 RX ORDER — METOPROLOL SUCCINATE 100 MG/1
150 TABLET, EXTENDED RELEASE ORAL DAILY
Qty: 135 TABLET | Refills: 0 | Status: SHIPPED | OUTPATIENT
Start: 2024-12-20 | End: 2025-03-20

## 2024-12-20 NOTE — TELEPHONE ENCOUNTER
All of his prescriptions were sent to [facility] pharmacy in [place].  Thank you.

## 2024-12-23 ENCOUNTER — APPOINTMENT (OUTPATIENT)
Dept: PRIMARY CARE | Facility: CLINIC | Age: 59
End: 2024-12-23
Payer: COMMERCIAL

## 2025-01-20 ENCOUNTER — APPOINTMENT (OUTPATIENT)
Dept: PRIMARY CARE | Facility: CLINIC | Age: 60
End: 2025-01-20
Payer: COMMERCIAL

## 2025-01-20 VITALS
BODY MASS INDEX: 28.37 KG/M2 | SYSTOLIC BLOOD PRESSURE: 128 MMHG | HEART RATE: 66 BPM | TEMPERATURE: 98 F | OXYGEN SATURATION: 95 % | RESPIRATION RATE: 16 BRPM | DIASTOLIC BLOOD PRESSURE: 86 MMHG | WEIGHT: 215 LBS

## 2025-01-20 DIAGNOSIS — I10 HYPERTENSION, ESSENTIAL, BENIGN: Primary | ICD-10-CM

## 2025-01-20 PROCEDURE — 99213 OFFICE O/P EST LOW 20 MIN: CPT | Performed by: FAMILY MEDICINE

## 2025-01-20 ASSESSMENT — ENCOUNTER SYMPTOMS
LOSS OF SENSATION IN FEET: 0
OCCASIONAL FEELINGS OF UNSTEADINESS: 0
DEPRESSION: 0

## 2025-01-20 ASSESSMENT — PATIENT HEALTH QUESTIONNAIRE - PHQ9
SUM OF ALL RESPONSES TO PHQ9 QUESTIONS 1 AND 2: 0
1. LITTLE INTEREST OR PLEASURE IN DOING THINGS: NOT AT ALL
2. FEELING DOWN, DEPRESSED OR HOPELESS: NOT AT ALL

## 2025-01-20 NOTE — PROGRESS NOTES
Subjective   Patient ID: Pacheco Jacobs is a 59 y.o. male who presents for Follow-up (BP fu. Checking at home and its averages 120/80s at home. ).    HPI  Patient comes in today for follow-up on his blood pressure.  It is doing very well today and he states they are also doing very well at home.  They are averaging in the 120s over 80s and his pulse has been averaging in the high 50s low 60s.  He is feeling well and is not having any side effects from the medication.    Review of Systems   All other systems reviewed and are negative.      Objective   Vitals:  /86   Pulse 66   Temp 36.7 °C (98 °F)   Resp 16   Wt 97.5 kg (215 lb)   SpO2 95%   BMI 28.37 kg/m²     Physical Exam  Constitutional:       Appearance: He is well-developed.   HENT:      Head: Normocephalic and atraumatic.      Right Ear: Tympanic membrane, ear canal and external ear normal.      Left Ear: Tympanic membrane, ear canal and external ear normal.      Nose: Nose normal.      Mouth/Throat:      Mouth: Mucous membranes are moist.      Pharynx: Oropharynx is clear.   Eyes:      Extraocular Movements: Extraocular movements intact.      Conjunctiva/sclera: Conjunctivae normal.      Pupils: Pupils are equal, round, and reactive to light.   Neck:      Comments: No thyroid nodules palpable at this time  Cardiovascular:      Rate and Rhythm: Normal rate and regular rhythm.      Heart sounds: Normal heart sounds. No murmur heard.  Pulmonary:      Effort: Pulmonary effort is normal.      Breath sounds: Normal breath sounds. No wheezing.   Abdominal:      General: Abdomen is flat. Bowel sounds are normal.      Palpations: Abdomen is soft.   Musculoskeletal:         General: Normal range of motion.   Skin:     General: Skin is warm and dry.   Neurological:      General: No focal deficit present.      Mental Status: He is alert and oriented to person, place, and time. Mental status is at baseline.   Psychiatric:         Mood and Affect: Mood normal.          Behavior: Behavior normal.         Thought Content: Thought content normal.         Judgment: Judgment normal.         Assessment/Plan   Problem List Items Addressed This Visit       Hypertension, essential, benign - Primary    Overview     Continue spironolactone 25 mg daily  Continue losartan 100 mg daily.  Continue metoprolol to 150 mg daily.    Continue diltiazem  mg daily        Continue current meds as directed.  Follow up in 6 months for recheck if all remains stable, sooner if problems arise.  Medication list reconciled.  Thank you for visiting today!      Professional services: Some of this note was completed using Dragon voice technology and sometimes the software misinterprets words. This may include unintended errors with respect to translation of words, typographical errors or grammar errors which may not have been identified prior to finalization of the chart note. Please take this into account when reading the note. Thank you.       Herman Baker DO 01/20/25 2:32 PM

## 2025-07-02 ENCOUNTER — TELEPHONE (OUTPATIENT)
Dept: PRIMARY CARE | Facility: CLINIC | Age: 60
End: 2025-07-02
Payer: COMMERCIAL

## 2025-07-02 NOTE — TELEPHONE ENCOUNTER
Received a call from [facility] requesting confirmation of a prescription.   Patient prescribed DilTIAZem, but has Amlodipine listed as an allergy in his chart. Pharmacy needs approval to fill DilTIAZem.     Per Dr. Baker, Amlodipine is not an allergy, patient had an intolerance to the medication. Okay to fill RX.     Attempted to return pharmacy call twice today, was not able to speak with anyone or leave a message. After remaining on hold 30-45+ mins, the call was disconnected.

## 2025-07-13 DIAGNOSIS — I10 HYPERTENSION, ESSENTIAL, BENIGN: ICD-10-CM

## 2025-07-15 RX ORDER — DILTIAZEM HYDROCHLORIDE 180 MG/1
180 CAPSULE, EXTENDED RELEASE ORAL DAILY
Qty: 90 CAPSULE | Refills: 0 | Status: SHIPPED | OUTPATIENT
Start: 2025-07-15

## 2025-07-21 ENCOUNTER — APPOINTMENT (OUTPATIENT)
Dept: PRIMARY CARE | Facility: CLINIC | Age: 60
End: 2025-07-21
Payer: COMMERCIAL

## 2025-07-21 VITALS
DIASTOLIC BLOOD PRESSURE: 85 MMHG | WEIGHT: 228 LBS | OXYGEN SATURATION: 96 % | HEART RATE: 52 BPM | RESPIRATION RATE: 20 BRPM | BODY MASS INDEX: 30.08 KG/M2 | SYSTOLIC BLOOD PRESSURE: 127 MMHG | TEMPERATURE: 97.8 F

## 2025-07-21 DIAGNOSIS — E78.2 HYPERLIPIDEMIA, MIXED: ICD-10-CM

## 2025-07-21 DIAGNOSIS — Z13.228 SCREENING FOR METABOLIC DISORDER: ICD-10-CM

## 2025-07-21 DIAGNOSIS — Z00.00 ROUTINE GENERAL MEDICAL EXAMINATION AT A HEALTH CARE FACILITY: Primary | ICD-10-CM

## 2025-07-21 DIAGNOSIS — I10 HYPERTENSION, ESSENTIAL, BENIGN: ICD-10-CM

## 2025-07-21 DIAGNOSIS — E53.8 VITAMIN B12 DEFICIENCY: ICD-10-CM

## 2025-07-21 DIAGNOSIS — N40.0 BENIGN PROSTATIC HYPERPLASIA, UNSPECIFIED WHETHER LOWER URINARY TRACT SYMPTOMS PRESENT: ICD-10-CM

## 2025-07-21 DIAGNOSIS — R73.9 HYPERGLYCEMIA: ICD-10-CM

## 2025-07-21 DIAGNOSIS — E55.9 VITAMIN D DEFICIENCY: ICD-10-CM

## 2025-07-21 DIAGNOSIS — R53.83 FATIGUE, UNSPECIFIED TYPE: ICD-10-CM

## 2025-07-21 PROCEDURE — 99396 PREV VISIT EST AGE 40-64: CPT | Performed by: FAMILY MEDICINE

## 2025-07-21 ASSESSMENT — ENCOUNTER SYMPTOMS
LOSS OF SENSATION IN FEET: 0
DEPRESSION: 0
OCCASIONAL FEELINGS OF UNSTEADINESS: 0

## 2025-07-21 NOTE — PROGRESS NOTES
Subjective   Patient ID: Pacheco Jacobs is a 59 y.o. male who presents for Annual Exam (Patient reported health Good//Regular Dental Visits yes//Regular Eye Visits yes//Hearing Loss no//Balanced Diet yes//Weight Concerns no//Exercise Regular///).    HPI  Patient presents today for 6-month checkup, physical and refill of meds.  He states that he is taking his medicines as directed and is not having any side effects from them. He currently is without complaints.  His blood pressure looks great today.  He states is normally like that at home as well    Review of Systems   All other systems reviewed and are negative.      Objective   Vitals:  /85   Pulse 52   Temp 36.6 °C (97.8 °F)   Resp 20   Wt 103 kg (228 lb)   SpO2 96%   BMI 30.08 kg/m²     Physical Exam  Constitutional:       Appearance: He is well-developed. He is obese.   HENT:      Head: Normocephalic and atraumatic.      Right Ear: Tympanic membrane, ear canal and external ear normal.      Left Ear: Tympanic membrane, ear canal and external ear normal.      Nose: Nose normal.      Mouth/Throat:      Mouth: Mucous membranes are moist.      Pharynx: Oropharynx is clear.     Eyes:      Extraocular Movements: Extraocular movements intact.      Conjunctiva/sclera: Conjunctivae normal.      Pupils: Pupils are equal, round, and reactive to light.     Neck:      Comments: No thyroid nodules palpable at this time  Cardiovascular:      Rate and Rhythm: Normal rate and regular rhythm.      Heart sounds: Normal heart sounds. No murmur heard.  Pulmonary:      Effort: Pulmonary effort is normal.      Breath sounds: Normal breath sounds. No wheezing.   Abdominal:      General: Abdomen is flat. Bowel sounds are normal.      Palpations: Abdomen is soft.   Genitourinary:     Penis: Normal.       Testes: Normal.     Musculoskeletal:         General: Normal range of motion.     Skin:     General: Skin is warm and dry.     Neurological:      General: No focal deficit  "present.      Mental Status: He is alert and oriented to person, place, and time. Mental status is at baseline.     Psychiatric:         Mood and Affect: Mood normal.         Behavior: Behavior normal.         Thought Content: Thought content normal.         Judgment: Judgment normal.         CBC -  Recent Labs     07/24/24  1411 05/25/23  0913 05/20/21  0933   WBC 7.1 6.9 6.3   HGB 14.6 15.4 14.7   HCT 43.5 47.3 45.6    211 229   MCV 88 89 90       CMP -  Recent Labs     07/24/24  1411 05/25/23  0913 05/20/21  0933    141 139   K 4.4 4.2 3.8    103 104   CO2 28 32 30   ANIONGAP 11 10 9*   BUN 17 17 15   CREATININE 1.17 1.27 1.06   EGFR 73  --   --      Recent Labs     07/24/24 1411 05/25/23  0913 05/20/21  0933   ALBUMIN 4.5 4.6 4.4   ALKPHOS 50 58 57   ALT 25 20 21   AST 20 17 24   BILITOT 1.2 1.3* 1.1       LIPID PANEL -   Recent Labs     05/25/23  0913 05/20/21  0933 12/12/19  1108   CHOL 208* 155 144   LDLF 145* 97 88   HDL 42.4 38.0* 36.7*   TRIG 104 100 99       No results for input(s): \"BNP\", \"HGBA1C\" in the last 81961 hours.    Lab Results   Component Value Date    WBQHZPSP44 294 07/24/2024       Lab Results   Component Value Date    VITD25 21 (L) 07/24/2024        Assessment/Plan   Problem List Items Addressed This Visit       Hypertension, essential, benign    Overview   Continue spironolactone 25 mg daily  Continue losartan 100 mg daily.  Continue metoprolol to 150 mg daily.    Continue diltiazem  mg daily         Vitamin D deficiency    Relevant Orders    Vitamin D 25-Hydroxy,Total (for eval of Vitamin D levels)    Vitamin B12 deficiency    Relevant Orders    CBC    Vitamin B12     Other Visit Diagnoses         Routine general medical examination at a health care facility    -  Primary      Screening for metabolic disorder        Relevant Orders    Comprehensive Metabolic Panel      Hyperglycemia        Relevant Orders    Hemoglobin A1C      Hyperlipidemia, mixed        " Relevant Orders    Lipid Panel      Fatigue, unspecified type        Relevant Orders    TSH with reflex to Free T4 if abnormal      Benign prostatic hyperplasia, unspecified whether lower urinary tract symptoms present        Relevant Orders    Prostate Specific Antigen        Will contact patient with lab results when available.  Continue current meds as directed.  Follow up in 6 months for recheck if all remains stable, sooner if problems arise.     Patient exhibiting no signs of depression and does not need treatment at this time.  Medication list reconciled.  Thank you for visiting today!        Professional services: Some of this note was completed using Dragon voice technology and sometimes the software misinterprets words. This may include unintended errors with respect to translation of words, typographical errors or grammar errors which may not have been identified prior to finalization of the chart note. Please take this into account when reading the note. Thank you.              Herman Baker DO 07/21/25 5:07 PM

## 2025-07-26 LAB
25(OH)D3+25(OH)D2 SERPL-MCNC: 31 NG/ML (ref 30–100)
ALBUMIN SERPL-MCNC: 4.5 G/DL (ref 3.6–5.1)
ALP SERPL-CCNC: 48 U/L (ref 35–144)
ALT SERPL-CCNC: 26 U/L (ref 9–46)
ANION GAP SERPL CALCULATED.4IONS-SCNC: 4 MMOL/L (CALC) (ref 7–17)
AST SERPL-CCNC: 18 U/L (ref 10–35)
BILIRUB SERPL-MCNC: 1 MG/DL (ref 0.2–1.2)
BUN SERPL-MCNC: 17 MG/DL (ref 7–25)
CALCIUM SERPL-MCNC: 9.7 MG/DL (ref 8.6–10.3)
CHLORIDE SERPL-SCNC: 105 MMOL/L (ref 98–110)
CHOLEST SERPL-MCNC: 192 MG/DL
CHOLEST/HDLC SERPL: 5.3 (CALC)
CO2 SERPL-SCNC: 29 MMOL/L (ref 20–32)
CREAT SERPL-MCNC: 1.32 MG/DL (ref 0.7–1.3)
EGFRCR SERPLBLD CKD-EPI 2021: 63 ML/MIN/1.73M2
ERYTHROCYTE [DISTWIDTH] IN BLOOD BY AUTOMATED COUNT: 11.7 % (ref 11–15)
EST. AVERAGE GLUCOSE BLD GHB EST-MCNC: 123 MG/DL
EST. AVERAGE GLUCOSE BLD GHB EST-SCNC: 6.8 MMOL/L
GLUCOSE SERPL-MCNC: 90 MG/DL (ref 65–99)
HBA1C MFR BLD: 5.9 %
HCT VFR BLD AUTO: 44.5 % (ref 38.5–50)
HDLC SERPL-MCNC: 36 MG/DL
HGB BLD-MCNC: 15 G/DL (ref 13.2–17.1)
LDLC SERPL CALC-MCNC: 126 MG/DL (CALC)
MCH RBC QN AUTO: 29.9 PG (ref 27–33)
MCHC RBC AUTO-ENTMCNC: 33.7 G/DL (ref 32–36)
MCV RBC AUTO: 88.8 FL (ref 80–100)
NONHDLC SERPL-MCNC: 156 MG/DL (CALC)
PLATELET # BLD AUTO: 242 THOUSAND/UL (ref 140–400)
PMV BLD REES-ECKER: 10.7 FL (ref 7.5–12.5)
POTASSIUM SERPL-SCNC: 5.8 MMOL/L (ref 3.5–5.3)
PROT SERPL-MCNC: 6.9 G/DL (ref 6.1–8.1)
PSA SERPL-MCNC: 1.01 NG/ML
RBC # BLD AUTO: 5.01 MILLION/UL (ref 4.2–5.8)
SODIUM SERPL-SCNC: 138 MMOL/L (ref 135–146)
TRIGL SERPL-MCNC: 183 MG/DL
TSH SERPL-ACNC: 2.1 MIU/L (ref 0.4–4.5)
VIT B12 SERPL-MCNC: 373 PG/ML (ref 200–1100)
WBC # BLD AUTO: 7.4 THOUSAND/UL (ref 3.8–10.8)